# Patient Record
Sex: FEMALE | Race: WHITE
[De-identification: names, ages, dates, MRNs, and addresses within clinical notes are randomized per-mention and may not be internally consistent; named-entity substitution may affect disease eponyms.]

---

## 2019-09-03 ENCOUNTER — HOSPITAL ENCOUNTER (EMERGENCY)
Dept: HOSPITAL 41 - JD.ED | Age: 44
Discharge: NURSING, REHAB OR LONG-TERM CARE | End: 2019-09-03
Payer: COMMERCIAL

## 2019-09-03 DIAGNOSIS — S61.214A: ICD-10-CM

## 2019-09-03 DIAGNOSIS — Z88.1: ICD-10-CM
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
## 2019-09-03 DIAGNOSIS — Z79.899: ICD-10-CM

## 2019-09-03 DIAGNOSIS — S68.610A: Primary | ICD-10-CM

## 2019-09-03 DIAGNOSIS — S62.632B: ICD-10-CM

## 2019-09-03 PROCEDURE — 29125 APPL SHORT ARM SPLINT STATIC: CPT

## 2019-09-03 PROCEDURE — 96365 THER/PROPH/DIAG IV INF INIT: CPT

## 2019-09-03 PROCEDURE — 99283 EMERGENCY DEPT VISIT LOW MDM: CPT

## 2019-09-03 PROCEDURE — 73130 X-RAY EXAM OF HAND: CPT

## 2019-09-03 PROCEDURE — 96375 TX/PRO/DX INJ NEW DRUG ADDON: CPT

## 2019-09-03 NOTE — EDM.PDOC
<Desiree Quinones - Last Filed: 09/03/19 12:20>





ED HPI GENERAL MEDICAL PROBLEM





- General


Chief Complaint: Upper Extremity Injury/Pain


Stated Complaint: CUT FINGERS IN 


Time Seen by Provider: 09/03/19 11:37


Source of Information: Reports: Patient


History Limitations: Reports: No Limitations





- History of Present Illness


INITIAL COMMENTS - FREE TEXT/NARRATIVE: 


Patient is a 43 year old female who presents today for laceration by  

involving the right distal index, middle and ring finger. The patient reports 

she was changing the bag on the  while it was running and sustained 

the lacerations. 


Onset: Sudden


Onset Date: 09/03/19


Location: Reports: Upper Extremity, Right


Severity: Severe


Associated Symptoms: Reports: No Other Symptoms


  ** Right Hand


Pain Score (Numeric/FACES): 10





- Related Data


 Allergies











Allergy/AdvReac Type Severity Reaction Status Date / Time


 


Tetracyclines Allergy  Hives Verified 09/03/19 12:07











Home Meds: 


 Home Meds





Calcium Carbonate [Calcium] 500 mg PO DAILY 09/03/19 [History]


Loratadine [Claritin] 10 mg PO ASDIRECTED PRN 09/03/19 [History]


Multivitamin with Minerals [Multiple Vitamin] 1 tab PO DAILY 09/03/19 [History]











Review of Systems





- Review of Systems


Review Of Systems: See Below





ED EXAM, GENERAL





- Physical Exam


Exam: See Below


Exam Limited By: No Limitations


General Appearance: Alert


Neurological: Alert, Oriented


Skin Exam: Wound/Incision (Distal tip of right index finger absent, with two 

lacerations to the middle finger, one distal to the DIP joint, and one near the 

DIP joint. Single laceration to the index finger distal to the DIP joint. )





Course





- Vital Signs


Text/Narrative:: 





Patient was given zofran for management of nausea


Patient would prefer a nerve block in place of oral medication due to 

difficulty tolerating oral medication secondary to GI sensitivity. She was 

provided with Dilaudid for pain management. She was also started on Ancef for 

prophylaxis. 


Transfer [facility] was discussed with their one call orthopedist. 





Last Recorded V/S: 





 Last Vital Signs











Temp  98.3 F   09/03/19 11:50


 


Pulse  99   09/03/19 11:50


 


Resp  18   09/03/19 11:50


 


BP  135/81   09/03/19 11:50


 


Pulse Ox  97   09/03/19 11:50














- Orders/Labs/Meds


Orders: 





 Active Orders 24 hr











 Category Date Time Status


 


 Peripheral IV Care [RC] .AS DIRECTED Care  09/03/19 11:37 Active


 


 Hand Comp Min 3V Rt [CR] Stat Exams  09/03/19 11:38 Taken


 


 Lidocaine 1% [Xylocaine-MPF 1%] Med  09/03/19 12:45 Active





 50 ml INJECT STAT   


 


 Sodium Chloride 0.9% [Saline Flush] Med  09/03/19 11:37 Active





 10 ml FLUSH ASDIRECTED PRN   


 


 Peripheral IV Insertion Adult [OM.PC] Routine Oth  09/03/19 11:37 Ordered








 Medication Orders





Lidocaine HCl (Xylocaine-Mpf 1%)  50 ml INJECT STAT MICHAEL


   Last Admin: 09/03/19 12:41  Dose: 50 ml


Sodium Chloride (Saline Flush)  10 ml FLUSH ASDIRECTED PRN


   PRN Reason: Keep Vein Open


   Last Admin: 09/03/19 12:17  Dose: 10 ml








Meds: 





Medications











Generic Name Dose Route Start Last Admin





  Trade Name Freq  PRN Reason Stop Dose Admin


 


Lidocaine HCl  50 ml  09/03/19 12:45  09/03/19 12:41





  Xylocaine-Mpf 1%  INJECT   50 ml





  STAT MICHAEL   Administration





     





     





     





     


 


Sodium Chloride  10 ml  09/03/19 11:37  09/03/19 12:17





  Saline Flush  FLUSH   10 ml





  ASDIRECTED PRN   Administration





  Keep Vein Open   





     





     





     














Discontinued Medications














Generic Name Dose Route Start Last Admin





  Trade Name Freq  PRN Reason Stop Dose Admin


 


Hydromorphone HCl  0.25 mg  09/03/19 11:39  09/03/19 12:15





  Dilaudid  IVPUSH  09/03/19 11:40  0.25 mg





  ONETIME ONE   Administration





     





     





     





     


 


Cefazolin Sodium/Dextrose 2 gm  50 mls @ 100 mls/hr  09/03/19 11:38  09/03/19 12

:20





  / Premix  IV  09/03/19 12:07  100 mls/hr





  ONETIME ONE   Administration





     





     





     





     


 


Ondansetron HCl  4 mg  09/03/19 12:10  09/03/19 12:18





  Zofran  IVPUSH  09/03/19 12:11  4 mg





  ONETIME ONE   Administration





     





     





     





     














Departure





- Departure


Disposition: DC[facility] to [facility]


Clinical Impression: 


Traumatic amputation of fingertip


Qualifiers:


 Encounter type: initial encounter Qualified Code(s): S68.119A - Complete 

traumatic metacarpophalangeal amputation of unspecified finger, initial 

encounter





Finger fracture, right


Qualifiers:


 Encounter type: initial encounter Finger: middle finger Fracture type: open 

Phalanx: distal Fracture alignment: displaced Qualified Code(s): S62.632B - 

Displaced fracture of distal phalanx of right middle finger, initial encounter 

for open fracture








- Discharge Information


Referrals: 


PCP,Unknown [Ordering Only Provider] - 


Forms:  ED Department Discharge





- My Orders


Last 24 Hours: 





My Active Orders





09/03/19 11:37


Peripheral IV Care [RC] .AS DIRECTED 


Sodium Chloride 0.9% [Saline Flush]   10 ml FLUSH ASDIRECTED PRN 


Peripheral IV Insertion Adult [OM.PC] Routine 





09/03/19 11:38


Hand Comp Min 3V Rt [CR] Stat 





09/03/19 12:45


Lidocaine 1% [Xylocaine-MPF 1%]   50 ml INJECT STAT 














- Assessment/Plan


Last 24 Hours: 





My Active Orders





09/03/19 11:37


Peripheral IV Care [RC] .AS DIRECTED 


Sodium Chloride 0.9% [Saline Flush]   10 ml FLUSH ASDIRECTED PRN 


Peripheral IV Insertion Adult [OM.PC] Routine 





09/03/19 11:38


Hand Comp Min 3V Rt [CR] Stat 





09/03/19 12:45


Lidocaine 1% [Xylocaine-MPF 1%]   50 ml INJECT STAT 














<Brian Degroot - Last Filed: 09/03/19 12:58>





Review of Systems





- Review of Systems


Review Of Systems: See Below


Constitutional: Reports: No Symptoms


Eyes: Reports: No Symptoms


Ears: Reports: No Symptoms


Nose: Reports: No Symptoms


Mouth/Throat: Reports: No Symptoms


Respiratory: Reports: No Symptoms


Cardiovascular: Reports: No Symptoms


GI/Abdominal: Reports: No Symptoms


Genitourinary: Reports: No Symptoms


Musculoskeletal: Reports: Other (Cuts and amputation of fingers)





ED EXAM, GENERAL





- Physical Exam


Exam: See Below


Exam Limited By: No Limitations


General Appearance: Alert


Ears: Normal External Exam


Nose: Normal Inspection


Head: Atraumatic, Normocephalic


Neck: Normal Inspection


Respiratory/Chest: No Respiratory Distress


Extremities: Other (Amputation of the tip of the right indix finger.  2 

lacerations to the 3rd right digit.  She still has sensation and capillary 

refill distally.  1 laceration to the 4th digit with good sensation and 

capillary refill distally.)


Neurological: Alert, Oriented





ED TRAUMA EXTREMITY PROCEDURES





- Splinting


  ** Right Upper Extremity


Splint Site: Hand


Pre-Procedure NV Status: Normal


Post-Procedure NV Status: Normal


Splint Material: Fiberglass


Splint Design: Volar


Applied & Form Fitted By: Provider


Provider Post-Splint Application NV Check: NV Status Normal, Good Position


Complications: No





Course





- Re-Assessments/Exams


Free Text/Narrative Re-Assessment/Exam: 





09/03/19 12:54


I examined the patient myself and I agree with Desiree's assessment and plan.  I 

ordered an IV and ancef 2 grams IV.  I also ordered dilaudid 0.5mg IV.  I did 

an x-ray and she has an amputation of the right index finger.  There is 

fractures to the distal 3rd and 4th fingers.  That is an open fracture.  We do 

not have ortho on today.  I called [facility] [place] and talked with 

Dr Granados.  He accepted the patient.  I did a ring block on the 3 fingers to give 

her more relief from the pain.  I put on wet dressings and splinted her hand.





Departure





- Departure


Time of Disposition: 13:00


Condition: Fair

## 2019-09-03 NOTE — CR
Right hand:  Four views of the right hand were obtained.

 

Comparison: No prior right hand exam.

 

Soft tissue and bony amputation is seen within the distal 2nd finger. 

 

 

Fracture is noted which is mildly comminuted within the mid and distal

 aspects of the distal phalanx of the 3rd finger.  

 

Comminuted fracture is noted within the distal aspect of the middle 

phalanx of the 3rd finger.  

 

Fracture is noted within the distal phalanx of the 4th finger with 

soft tissue injury.  No proximal bony abnormality is seen.

 

Impression:

1.  Injury within the 2nd through 4th fingers as described above.

 

Diagnostic code #3

## 2022-07-22 ENCOUNTER — HOSPITAL ENCOUNTER (EMERGENCY)
Dept: HOSPITAL 41 - JD.ED | Age: 47
Discharge: HOME | End: 2022-07-22
Payer: COMMERCIAL

## 2022-07-22 DIAGNOSIS — N39.0: Primary | ICD-10-CM

## 2022-07-22 DIAGNOSIS — Z88.1: ICD-10-CM

## 2022-07-22 DIAGNOSIS — Z79.899: ICD-10-CM

## 2023-01-09 ENCOUNTER — HOSPITAL ENCOUNTER (EMERGENCY)
Dept: HOSPITAL 41 - JD.ED | Age: 48
Discharge: HOME | End: 2023-01-09
Payer: COMMERCIAL

## 2023-01-09 DIAGNOSIS — N30.01: Primary | ICD-10-CM

## 2023-01-09 DIAGNOSIS — Z88.1: ICD-10-CM

## 2023-01-09 DIAGNOSIS — T36.8X5A: ICD-10-CM

## 2023-01-09 DIAGNOSIS — Z88.2: ICD-10-CM

## 2023-01-09 LAB — EGFRCR SERPLBLD CKD-EPI 2021: 92 ML/MIN (ref 60–?)

## 2023-01-09 PROCEDURE — 87086 URINE CULTURE/COLONY COUNT: CPT

## 2023-01-09 PROCEDURE — 85025 COMPLETE CBC W/AUTO DIFF WBC: CPT

## 2023-01-09 PROCEDURE — 86140 C-REACTIVE PROTEIN: CPT

## 2023-01-09 PROCEDURE — 80053 COMPREHEN METABOLIC PANEL: CPT

## 2023-01-09 PROCEDURE — 96361 HYDRATE IV INFUSION ADD-ON: CPT

## 2023-01-09 PROCEDURE — 96375 TX/PRO/DX INJ NEW DRUG ADDON: CPT

## 2023-01-09 PROCEDURE — 36415 COLL VENOUS BLD VENIPUNCTURE: CPT

## 2023-01-09 PROCEDURE — 83605 ASSAY OF LACTIC ACID: CPT

## 2023-01-09 PROCEDURE — 81001 URINALYSIS AUTO W/SCOPE: CPT

## 2023-01-09 PROCEDURE — 96365 THER/PROPH/DIAG IV INF INIT: CPT

## 2023-01-09 PROCEDURE — 99283 EMERGENCY DEPT VISIT LOW MDM: CPT
